# Patient Record
Sex: FEMALE | Race: WHITE | NOT HISPANIC OR LATINO | Employment: OTHER | URBAN - NONMETROPOLITAN AREA
[De-identification: names, ages, dates, MRNs, and addresses within clinical notes are randomized per-mention and may not be internally consistent; named-entity substitution may affect disease eponyms.]

---

## 2017-01-05 DIAGNOSIS — R51.9 HA (HEADACHE): Primary | ICD-10-CM

## 2017-01-05 LAB — COHGB MFR BLD: 1.6 % (ref 0–2)

## 2017-01-05 PROCEDURE — 82375 ASSAY CARBOXYHB QUANT: CPT | Performed by: FAMILY MEDICINE

## 2017-01-05 PROCEDURE — 36415 COLL VENOUS BLD VENIPUNCTURE: CPT | Performed by: FAMILY MEDICINE

## 2017-02-06 DIAGNOSIS — Z12.31 VISIT FOR SCREENING MAMMOGRAM: Primary | ICD-10-CM

## 2017-02-08 DIAGNOSIS — Z12.31 VISIT FOR SCREENING MAMMOGRAM: Primary | ICD-10-CM

## 2017-02-08 PROCEDURE — G0202 SCR MAMMO BI INCL CAD: HCPCS | Mod: TC

## 2017-02-08 PROCEDURE — 77063 BREAST TOMOSYNTHESIS BI: CPT | Mod: TC

## 2017-02-09 NOTE — Clinical Note
February 9, 2017      Darby JEFF Hawkins  [address] [place] [address]      [facility]  Breast Center      Your mammogram shows that your breast tissue is dense.  Dense breast tissue is relatively common and is found in more than 40% of women.  However, dense breast tissue may make it more difficult to identify precancerous lesions or cancer through a mammogram and may also be associated with an increased risk of breast cancer.    The results of your mammogram are given to you to raise your awareness and help you talk with your primary care physician who has received a report of your mammogram results.  Together you can decide which options are right for you based on your mammogram results, individual risk factors or physical examination.    Your images will become part of your medical file at [facility] and will be available to you for continuing care.  You are responsible for informing any new health care provider or breast imaging facility of the date and location of this exam.    Mammography is the only way to determine breast density and remains the gold standard for early breast cancer detection.  Women should continue to have annual mammograms regardless of their breast density.  If you notice any new changes in your breast(s) please inform your health care provider without delay.   If you have any questions regarding breast density, please feel free to contact [facility] Center Navigator at [phone].    Thank you for choosing [facility] for your healthcare needs.

## 2018-01-23 ENCOUNTER — AMBULATORY - GICH (OUTPATIENT)
Dept: RADIOLOGY | Facility: OTHER | Age: 78
End: 2018-01-23

## 2018-01-23 DIAGNOSIS — M25.551 PAIN IN RIGHT HIP: ICD-10-CM

## 2018-01-24 ENCOUNTER — HOSPITAL ENCOUNTER (OUTPATIENT)
Dept: RADIOLOGY | Facility: OTHER | Age: 78
End: 2018-01-24

## 2018-01-24 DIAGNOSIS — M25.551 PAIN IN RIGHT HIP: ICD-10-CM

## 2018-04-26 ENCOUNTER — RADIANT APPOINTMENT (OUTPATIENT)
Dept: MAMMOGRAPHY | Facility: OTHER | Age: 78
End: 2018-04-26
Attending: FAMILY MEDICINE
Payer: MEDICARE

## 2018-04-26 DIAGNOSIS — Z12.31 VISIT FOR SCREENING MAMMOGRAM: ICD-10-CM

## 2018-04-26 PROCEDURE — 77067 SCR MAMMO BI INCL CAD: CPT | Mod: TC

## 2018-05-01 ENCOUNTER — OFFICE VISIT (OUTPATIENT)
Dept: FAMILY MEDICINE | Facility: OTHER | Age: 78
End: 2018-05-01
Attending: NURSE PRACTITIONER
Payer: MEDICARE

## 2018-05-01 VITALS
TEMPERATURE: 98.2 F | BODY MASS INDEX: 22.58 KG/M2 | SYSTOLIC BLOOD PRESSURE: 110 MMHG | RESPIRATION RATE: 20 BRPM | HEART RATE: 74 BPM | DIASTOLIC BLOOD PRESSURE: 60 MMHG | HEIGHT: 67 IN | WEIGHT: 143.9 LBS

## 2018-05-01 DIAGNOSIS — T16.1XXA FOREIGN BODY OF RIGHT EAR, INITIAL ENCOUNTER: Primary | ICD-10-CM

## 2018-05-01 PROCEDURE — 69200 CLEAR OUTER EAR CANAL: CPT | Performed by: NURSE PRACTITIONER

## 2018-05-01 PROCEDURE — 99207 ZZC DROP WITH A PROCEDURE: CPT | Mod: 25 | Performed by: NURSE PRACTITIONER

## 2018-05-01 PROCEDURE — G0463 HOSPITAL OUTPT CLINIC VISIT: HCPCS

## 2018-05-01 ASSESSMENT — PAIN SCALES - GENERAL: PAINLEVEL: MILD PAIN (2)

## 2018-05-01 NOTE — PROGRESS NOTES
"Nursing Notes:   Sobia Haas LPN  5/1/2018 10:47 AM  Signed  Patient presents to the clinic for something stuck in her right ear. Has bought an OTC ear wax removal kit and was using it this morning and a piece broke off inside her ear. As far as patient is aware of, it is still inside her ear.   Sobia Haas LPN............. May 1, 2018 10:38 AM     SUBJECTIVE:   Darby Hawkins is a 77 year old female who presents to clinic today for the following health issues:    Ear concern      Duration: today    Description (location/character/radiation): Broke off a ear cleaning tool in her ear canal.     Intensity:  mild    Accompanying signs and symptoms: None, no s/s of systemic illness.     History (similar episodes/previous evaluation): None    Precipitating or alleviating factors: None    Therapies tried and outcome: None       Problem list and histories reviewed & adjusted, as indicated.  Additional history: as documented    Current Outpatient Prescriptions   Medication Sig Dispense Refill     ASPIRIN PO Take 81 mg by mouth daily as needed        Calcium Carbonate-Vitamin D (CALCIUM + D PO) 500-1000 MG one tablet every other day.       Cyanocobalamin (VITAMIN B 12 PO) Take 500 mcg by mouth daily        Escitalopram Oxalate (LEXAPRO PO) Take 10 mg by mouth daily.       Eszopiclone (LUNESTA PO) Take 2 mg by mouth nightly as needed for sleep       Ibuprofen (ADVIL PO) Take 400 mg by mouth nightly as needed        Allergies   Allergen Reactions     Seasonal Allergies        Reviewed and updated as needed this visit by clinical staff  Tobacco  Allergies  Meds       Reviewed and updated as needed this visit by Provider         ROS:  A comprehensive 10 point ROS was obtained and documented for notable findings in the HPI.       OBJECTIVE:     /60 (BP Location: Right arm, Patient Position: Sitting, Cuff Size: Adult Regular)  Pulse 74  Temp 98.2  F (36.8  C) (Tympanic)  Resp 20  Ht 5' 7\" (1.702 " m)  Wt 143 lb 14.4 oz (65.3 kg)  Breastfeeding? No  BMI 22.54 kg/m2  Body mass index is 22.54 kg/(m^2).  GENERAL: healthy, alert and no distress  EYES: Eyes grossly normal to inspection  HENT: normal cephalic/atraumatic, right ear: FB in ear, TM wnl and oral mucous membranes moist  RESP: with ease    Diagnostic Test Results:  none     FB well visualized and with an alligator forceps the object was removed.   The TM and canal are WNL after removal. Patient tolerated well.      ASSESSMENT/PLAN:     1. Foreign body of right ear, initial encounter  - REMOVE FB, EXT AUDITORY CANAL    Medical Decision Making:    Differential Diagnosis:  None    Serious Comorbid Conditions:  Adult:  None    PLAN:    Recommend not using tools like this again.     Followup:    If not improving or if condition worsens, follow up with your Primary Care Provider        Halle Em NP, 5/1/2018 10:48 AM

## 2018-05-01 NOTE — MR AVS SNAPSHOT
"              After Visit Summary   5/1/2018    Darby Hawkins    MRN: [id number]           Patient Information     Date Of Birth          1940        Visit Information        Provider Department      5/1/2018 10:15 AM Halle Em NP [facility]        Today's Diagnoses     Foreign body of right ear, initial encounter    -  1       Follow-ups after your visit        Follow-up notes from your care team     Return if symptoms worsen or fail to improve.      Who to contact     If you have questions or need follow up information about today's clinic visit or your schedule please contact [facility] directly at [phone].  Normal or non-critical lab and imaging results will be communicated to you by ZanAquahart, letter or phone within 4 business days after the clinic has received the results. If you do not hear from us within 7 days, please contact the clinic through ZanAquahart or phone. If you have a critical or abnormal lab result, we will notify you by phone as soon as possible.  Submit refill requests through WeStore or call your pharmacy and they will forward the refill request to us. Please allow 3 business days for your refill to be completed.          Additional Information About Your Visit        MyChart Information     WeStore gives you secure access to your electronic health record. If you see a primary care provider, you can also send messages to your care team and make appointments. If you have questions, please call your primary care clinic.  If you do not have a primary care provider, please call [phone] and they will assist you.        Care EveryWhere ID     This is your Care EveryWhere ID. This could be used by other organizations to access your [place] medical records  [id number]        Your Vitals Were     Pulse Temperature Respirations Height Breastfeeding? BMI (Body Mass Index)    74 98.2  F (36.8  C) (Tympanic) 20 5' 7\" (1.702 m) No 22.54 " kg/m2       Blood Pressure from Last 3 Encounters:   05/01/18 110/60   05/22/15 129/99   07/28/13 112/53    Weight from Last 3 Encounters:   05/01/18 143 lb 14.4 oz (65.3 kg)   05/22/15 134 lb (60.8 kg)   07/27/13 139 lb 12.4 oz (63.4 kg)              We Performed the Following     REMOVE FB, EXT AUDITORY CANAL        Primary Care Provider Fax #    Physician No Ref-Primary [phone]       No address on file        Equal Access to Services     [facility] Development[facility] CenterJEFF : Hadii aad ku hadasho Soomaali, waaxda luqadaha, qaybta kaalmada adeegyada, edgard hernandez . So [facility] [phone].    ATENCIÓN: Si habla español, tiene a lim disposición servicios gratuitos de asistencia lingüística. Llame al [phone].    We comply with applicable federal civil rights laws and [place] laws. We do not discriminate on the basis of race, color, national origin, age, disability, sex, sexual orientation, or gender identity.            Thank you!     Thank you for choosing [facility] AND [facility]  for your care. Our goal is always to provide you with excellent care. Hearing back from our patients is one way we can continue to improve our services. Please take a few minutes to complete the written survey that you may receive in the mail after your visit with us. Thank you!             Your Updated Medication List - Protect others around you: Learn how to safely use, store and throw away your medicines at www.disposemymeds.org.          This list is accurate as of 5/1/18 11:07 AM.  Always use your most recent med list.                   Brand Name Dispense Instructions for use Diagnosis    ADVIL PO      Take 400 mg by mouth nightly as needed        ASPIRIN PO      Take 81 mg by mouth daily as needed        CALCIUM + D PO      500-1000 MG one tablet every other day.        LEXAPRO PO      Take 10 mg by mouth daily.        LUNESTA PO      Take 2 mg by mouth nightly as needed for sleep        VITAMIN B 12 PO       Take 500 mcg by mouth daily

## 2018-05-01 NOTE — NURSING NOTE
Patient presents to the clinic for something stuck in her right ear. Has bought an OTC ear wax removal kit and was using it this morning and a piece broke off inside her ear. As far as patient is aware of, it is still inside her ear.   Sobia Haas LPN............. May 1, 2018 10:38 AM

## 2018-05-04 ENCOUNTER — OFFICE VISIT (OUTPATIENT)
Dept: FAMILY MEDICINE | Facility: OTHER | Age: 78
End: 2018-05-04
Attending: FAMILY MEDICINE
Payer: MEDICARE

## 2018-05-04 VITALS
HEIGHT: 67 IN | SYSTOLIC BLOOD PRESSURE: 120 MMHG | BODY MASS INDEX: 22.29 KG/M2 | HEART RATE: 68 BPM | TEMPERATURE: 97.8 F | DIASTOLIC BLOOD PRESSURE: 70 MMHG | WEIGHT: 142 LBS

## 2018-05-04 DIAGNOSIS — H60.391 INFECTIVE OTITIS EXTERNA, RIGHT: Primary | ICD-10-CM

## 2018-05-04 DIAGNOSIS — H60.391 INFECTIVE OTITIS EXTERNA, RIGHT: ICD-10-CM

## 2018-05-04 PROCEDURE — 99213 OFFICE O/P EST LOW 20 MIN: CPT | Performed by: FAMILY MEDICINE

## 2018-05-04 PROCEDURE — G0463 HOSPITAL OUTPT CLINIC VISIT: HCPCS

## 2018-05-04 RX ORDER — NEOMYCIN SULFATE, POLYMYXIN B SULFATE, HYDROCORTISONE 3.5; 10000; 1 MG/ML; [USP'U]/ML; MG/ML
3 SOLUTION/ DROPS AURICULAR (OTIC) 3 TIMES DAILY
Qty: 10 ML | Refills: 0 | Status: SHIPPED | OUTPATIENT
Start: 2018-05-04 | End: 2018-05-04

## 2018-05-04 RX ORDER — NEOMYCIN SULFATE, POLYMYXIN B SULFATE, HYDROCORTISONE 3.5; 10000; 1 MG/ML; [USP'U]/ML; MG/ML
3 SOLUTION/ DROPS AURICULAR (OTIC) 3 TIMES DAILY
Qty: 10 ML | Refills: 0 | Status: SHIPPED | OUTPATIENT
Start: 2018-05-04 | End: 2020-01-17

## 2018-05-04 ASSESSMENT — PAIN SCALES - GENERAL: PAINLEVEL: NO PAIN (0)

## 2018-05-04 NOTE — TELEPHONE ENCOUNTER
neomycin-polymyxin-hydrocortisone (CORTISPORIN) otic solution- is not carried at [facility] would like it sent somewhere else, doesn't care where.  [facility] did tell her they carry a similar ear solution but they can't change it.    [facility] called they have a lazaro-poly B- hydro suspension  [facility] called-they currently are out od stock.   [facility] called- have a bottle on hand.   Sheeba Mcdonough LPN ..........5/4/2018 2:30 PM

## 2018-05-04 NOTE — TELEPHONE ENCOUNTER
Call patient - ask if ok to send prescription to [facility]   Vidya Zuleta MD

## 2018-05-04 NOTE — PROGRESS NOTES
Nursing Notes:   Kiki Castro LPN  5/4/2018 11:07 AM  Signed  Patient presents to the clinic today for some right ear discomfort, and she states it also feels plugged.    Kiki Matthew VELAZQUEZ.................. 5/4/2018 10:58 AM       SUBJECTIVE:   CC:  Darby Hawkins is a 77 year old female who presents to clinic today for the following health issues:  Right ear symptoms.    HPI  Darby Hawkins is a 77 year old female in for evaluation of right ear plugging.  She has had problems with recurrent cerumenosis.  Had been using an over the counter ear wax mechanical removal device.  Came to [facility]  on Tuesday as part of this device broke off in her ear - she's worried that her ear was scratched or some is still in her ear.  No bleeding.      No bleeding, no discharge.  Allergies   Allergen Reactions     Seasonal Allergies      Current Outpatient Prescriptions   Medication     ASPIRIN PO     Calcium Carbonate-Vitamin D (CALCIUM + D PO)     Cyanocobalamin (VITAMIN B 12 PO)     Eszopiclone (LUNESTA PO)     Ibuprofen (ADVIL PO)     neomycin-polymyxin-hydrocortisone (CORTISPORIN) otic solution     No current facility-administered medications for this visit.       Patient Active Problem List    Diagnosis Date Noted     Syncope and collapse 07/28/2013     Priority: Medium     Past Surgical History:   Procedure Laterality Date     BACK SURGERY       BREAST SURGERY       COLONOSCOPY       COLONOSCOPY N/A 5/22/2015    Procedure: COLONOSCOPY;  Surgeon: Wellington Kellogg MD;  Location: [facility]     ENT SURGERY       EYE SURGERY       GYN SURGERY       VASCULAR SURGERY       No family history on file.    Review of Systems     PHQ-2 Score:     PHQ-2 ( 1999 Pf[employer]) 5/4/2018 5/1/2018   Q1: Little interest or pleasure in doing things 0 0   Q2: Feeling down, depressed or hopeless 0 0   PHQ-2 Score 0 0         No flowsheet data found.      OBJECTIVE:     /70 (BP Location: Right arm, Patient Position: Sitting, Cuff Size:  "Adult Regular)  Pulse 68  Temp 97.8  F (36.6  C) (Tympanic)  Ht 5' 7\" (1.702 m)  Wt 142 lb (64.4 kg)  Breastfeeding? No  BMI 22.24 kg/m2  Body mass index is 22.24 kg/(m^2).  Physical Exam   HENT:   Left Ear: External ear normal.   Left TM and canal normal.  Right TM normal, canal with abrasion posteriorly.            ASSESSMENT/PLAN:       ICD-10-CM    1. Infective otitis externa, right H60.391 DISCONTINUED: neomycin-polymyxin-hydrocortisone (CORTISPORIN) otic solution            PLAN:  1.  Discussed abrasion/scratch that is present and treating this similar to other skin abrasions.  Topical antibiotic solution for 2-3 days.  Return to clinic if bleeding, discharge, increased pain.      AILIN CELAYA MD  [facility]    "

## 2018-05-04 NOTE — MR AVS SNAPSHOT
"              After Visit Summary   5/4/2018    Darby Hawkins    MRN: [id number]           Patient Information     Date Of Birth          1940        Visit Information        Provider Department      5/4/2018 11:00 AM Vidya Middleton MD [facility]        Today's Diagnoses     Infective otitis externa, right    -  1       Follow-ups after your visit        Who to contact     If you have questions or need follow up information about today's clinic visit or your schedule please contact [facility] directly at [phone].  Normal or non-critical lab and imaging results will be communicated to you by [email or web address], letter or phone within 4 business days after the clinic has received the results. If you do not hear from us within 7 days, please contact the clinic through EntomoPharmt or phone. If you have a critical or abnormal lab result, we will notify you by phone as soon as possible.  Submit refill requests through [id number] or call your pharmacy and they will forward the refill request to us. Please allow 3 business days for your refill to be completed.          Additional Information About Your Visit        MyChart Information     [id number] gives you secure access to your electronic health record. If you see a primary care provider, you can also send messages to your care team and make appointments. If you have questions, please call your primary care clinic.  If you do not have a primary care provider, please call [phone] and they will assist you.        Care EveryWhere ID     This is your Care EveryWhere ID. This could be used by other organizations to access your [place] medical records  [id number]        Your Vitals Were     Pulse Temperature Height Breastfeeding? BMI (Body Mass Index)       68 97.8  F (36.6  C) (Tympanic) 5' 7\" (1.702 m) No 22.24 kg/m2        Blood Pressure from Last 3 Encounters:   05/04/18 120/70   05/01/18 110/60   05/22/15 129/99    " Weight from Last 3 Encounters:   05/04/18 142 lb (64.4 kg)   05/01/18 143 lb 14.4 oz (65.3 kg)   05/22/15 134 lb (60.8 kg)              Today, you had the following     No orders found for display         Today's Medication Changes          These changes are accurate as of 5/4/18 11:59 PM.  If you have any questions, ask your nurse or doctor.               Start taking these medicines.        Dose/Directions    neomycin-polymyxin-hydrocortisone otic solution   Commonly known as:  CORTISPORIN   Used for:  Infective otitis externa, right   Started by:  Vidya Middleton MD        Dose:  3 drop   Place 3 drops into the right ear 3 times daily   Quantity:  10 mL   Refills:  0         Stop taking these medicines if you haven't already. Please contact your care team if you have questions.     LEXAPRO PO   Stopped by:  Vidya Middleton MD                Where to get your medicines      These medications were sent to [facility], - [place], [place] - [address]  [email or web address], [place] [address]     Phone:  [phone]     neomycin-polymyxin-hydrocortisone otic solution                Primary Care Provider Fax #    Physician No Ref-Primary [phone]       No address on file        Equal Access to Services     BEENA MOREIRA : Norbert prajapatio Keya, waaxda luqadaha, qaybta kaalmada adeegyada, edgard chan. So [facility] [phone].    ATENCIÓN: Si habla español, tiene a lim disposición servicios gratuitos de asistencia lingüística. Llame al [phone].    We comply with applicable federal civil rights laws and [place] laws. We do not discriminate on the basis of race, color, national origin, age, disability, sex, sexual orientation, or gender identity.            Thank you!     Thank you for choosing [facility] AND [facility]  for your care. Our goal is always to provide you with excellent care. Hearing back from our  patients is one way we can continue to improve our services. Please take a few minutes to complete the written survey that you may receive in the mail after your visit with us. Thank you!             Your Updated Medication List - Protect others around you: Learn how to safely use, store and throw away your medicines at www.disposemymeds.org.          This list is accurate as of 5/4/18 11:59 PM.  Always use your most recent med list.                   Brand Name Dispense Instructions for use Diagnosis    ADVIL PO      Take 400 mg by mouth nightly as needed        ASPIRIN PO      Take 81 mg by mouth daily as needed        CALCIUM + D PO      500-1000 MG one tablet every other day.        LUNESTA PO      Take 2 mg by mouth nightly as needed for sleep        neomycin-polymyxin-hydrocortisone otic solution    CORTISPORIN    10 mL    Place 3 drops into the right ear 3 times daily    Infective otitis externa, right       VITAMIN B 12 PO      Take 500 mcg by mouth daily

## 2018-05-04 NOTE — NURSING NOTE
Patient presents to the clinic today for some right ear discomfort, and she states it also feels plugged.    Kiki Castro LPN.................. 5/4/2018 10:58 AM

## 2018-07-27 ENCOUNTER — ALLIED HEALTH/NURSE VISIT (OUTPATIENT)
Dept: FAMILY MEDICINE | Facility: OTHER | Age: 78
End: 2018-07-27
Attending: NURSE PRACTITIONER
Payer: MEDICARE

## 2018-07-27 DIAGNOSIS — Z23 NEED FOR DIPHTHERIA-TETANUS-PERTUSSIS (TDAP) VACCINE, ADULT/ADOLESCENT: Primary | ICD-10-CM

## 2018-07-27 PROCEDURE — 90471 IMMUNIZATION ADMIN: CPT

## 2018-07-27 PROCEDURE — 90715 TDAP VACCINE 7 YRS/> IM: CPT

## 2018-07-27 NOTE — MR AVS SNAPSHOT
After Visit Summary   7/27/2018    Darby Hawkins    MRN: [id number]           Patient Information     Date Of Birth          1940        Visit Information        Provider Department      7/27/2018 2:00 PM [facility] NURSE [facility]        Today's Diagnoses     Need for diphtheria-tetanus-pertussis (Tdap) vaccine, adult/adolescent    -  1       Follow-ups after your visit        Who to contact     If you have questions or need follow up information about today's clinic visit or your schedule please contact [facility] directly at [phone].  Normal or non-critical lab and imaging results will be communicated to you by NetAmerica Alliancehart, letter or phone within 4 business days after the clinic has received the results. If you do not hear from us within 7 days, please contact the clinic through 1Lay or phone. If you have a critical or abnormal lab result, we will notify you by phone as soon as possible.  Submit refill requests through 1Lay or call your pharmacy and they will forward the refill request to us. Please allow 3 business days for your refill to be completed.          Additional Information About Your Visit        MyChart Information     1Lay gives you secure access to your electronic health record. If you see a primary care provider, you can also send messages to your care team and make appointments. If you have questions, please call your primary care clinic.  If you do not have a primary care provider, please call [phone] and they will assist you.        Care EveryWhere ID     This is your Care EveryWhere ID. This could be used by other organizations to access your [place] medical records  [id number]         Blood Pressure from Last 3 Encounters:   05/04/18 120/70   05/01/18 110/60   05/22/15 129/99    Weight from Last 3 Encounters:   05/04/18 142 lb (64.4 kg)   05/01/18 143 lb 14.4 oz (65.3 kg)   05/22/15 134 lb (60.8 kg)               We Performed the Following     GH IMM-  TDAP VACCINE (BOOSTRIX )        Primary Care Provider Fax #    Physician No Ref-Primary [phone]       No address on file        Equal Access to Services     BEENA MOREIRA : Hadwagner aad ku hadayesha Lauar, petar kaplan, mo kaeyad sanders, edgard tuckeralireza chan. So [facility] [phone].    ATENCIÓN: Si habla español, tiene a lim disposición servicios gratuitos de asistencia lingüística. Llame al [phone].    We comply with applicable federal civil rights laws and [place] laws. We do not discriminate on the basis of race, color, national origin, age, disability, sex, sexual orientation, or gender identity.            Thank you!     Thank you for choosing [facility] AND [facility]  for your care. Our goal is always to provide you with excellent care. Hearing back from our patients is one way we can continue to improve our services. Please take a few minutes to complete the written survey that you may receive in the mail after your visit with us. Thank you!             Your Updated Medication List - Protect others around you: Learn how to safely use, store and throw away your medicines at www.disposemymeds.org.          This list is accurate as of 7/27/18  2:29 PM.  Always use your most recent med list.                   Brand Name Dispense Instructions for use Diagnosis    ADVIL PO      Take 400 mg by mouth nightly as needed        ASPIRIN PO      Take 81 mg by mouth daily as needed        CALCIUM + D PO      500-1000 MG one tablet every other day.        LUNESTA PO      Take 2 mg by mouth nightly as needed for sleep        neomycin-polymyxin-hydrocortisone otic solution    CORTISPORIN    10 mL    Place 3 drops into the right ear 3 times daily    Infective otitis externa, right       VITAMIN B 12 PO      Take 500 mcg by mouth daily

## 2018-08-07 ENCOUNTER — OFFICE VISIT (OUTPATIENT)
Dept: OTOLARYNGOLOGY | Facility: OTHER | Age: 78
End: 2018-08-07
Attending: OTOLARYNGOLOGY
Payer: MEDICARE

## 2018-08-07 DIAGNOSIS — J31.0 CHRONIC RHINITIS, UNSPECIFIED TYPE: Primary | ICD-10-CM

## 2018-08-07 PROCEDURE — G0463 HOSPITAL OUTPT CLINIC VISIT: HCPCS

## 2018-08-07 NOTE — MR AVS SNAPSHOT
After Visit Summary   8/7/2018    Darby Hawkins    MRN: [id number]           Patient Information     Date Of Birth          1940        Visit Information        Provider Department      8/7/2018 10:50 AM Goyo Alarcon MD [facility]        Today's Diagnoses     Chronic rhinitis, unspecified type    -  1       Follow-ups after your visit        Who to contact     If you have questions or need follow up information about today's clinic visit or your schedule please contact [facility] directly at [phone].  Normal or non-critical lab and imaging results will be communicated to you by [employer], letter or phone within 4 business days after the clinic has received the results. If you do not hear from us within 7 days, please contact the clinic through [employer] or phone. If you have a critical or abnormal lab result, we will notify you by phone as soon as possible.  Submit refill requests through [employer] or call your pharmacy and they will forward the refill request to us. Please allow 3 business days for your refill to be completed.          Additional Information About Your Visit        MyChart Information     [employer] gives you secure access to your electronic health record. If you see a primary care provider, you can also send messages to your care team and make appointments. If you have questions, please call your primary care clinic.  If you do not have a primary care provider, please call [phone] and they will assist you.        Care EveryWhere ID     This is your Care EveryWhere ID. This could be used by other organizations to access your [facility] medical records  [id number]         Blood Pressure from Last 3 Encounters:   05/04/18 120/70   05/01/18 110/60   05/22/15 129/99    Weight from Last 3 Encounters:   05/04/18 64.4 kg (142 lb)   05/01/18 65.3 kg (143 lb 14.4 oz)   05/22/15 60.8 kg (134 lb)              Today, you had the  following     No orders found for display       Primary Care Provider Fax #    Physician No Ref-Primary [phone]       No address on file        Equal Access to Services     BEENA MOREIRA : Hadii richard chavez brent Laura, petar kaplan, mo sanders, edgard Persaud [facility] [phone].    ATENCIÓN: Si habla español, tiene a lim disposición servicios gratuitos de asistencia lingüística. Llame al [phone].    We comply with applicable federal civil rights laws and [place] laws. We do not discriminate on the basis of race, color, national origin, age, disability, sex, sexual orientation, or gender identity.            Thank you!     Thank you for choosing [facility] AND [facility]  for your care. Our goal is always to provide you with excellent care. Hearing back from our patients is one way we can continue to improve our services. Please take a few minutes to complete the written survey that you may receive in the mail after your visit with us. Thank you!             Your Updated Medication List - Protect others around you: Learn how to safely use, store and throw away your medicines at www.disposemymeds.org.          This list is accurate as of 8/7/18 11:59 PM.  Always use your most recent med list.                   Brand Name Dispense Instructions for use Diagnosis    ADVIL PO      Take 400 mg by mouth nightly as needed        ASPIRIN PO      Take 81 mg by mouth daily as needed        CALCIUM + D PO      500-1000 MG one tablet every other day.        LUNESTA PO      Take 2 mg by mouth nightly as needed for sleep        neomycin-polymyxin-hydrocortisone otic solution    CORTISPORIN    10 mL    Place 3 drops into the right ear 3 times daily    Infective otitis externa, right       VITAMIN B 12 PO      Take 500 mcg by mouth daily

## 2018-08-13 NOTE — PROGRESS NOTES
REJI GOMEZ    77 Y old Female, : 1940    Account Number: [id number]    [address], [place]    Home: [phone]     Guarantor: REJI GOMEZ Insurance: [facility] MEDICARE B Payer ID: [id number]   PCP: SHAYAN JOHNSON   Appointment Facility: [facility]      2018 Goyo Alarcon MD       Current Medications Reason for Appointment     1. BALANCE ISSUES/CHRONIC SINUS CONGESTION AND HEADACHES     2. Chronic sinus symptoms     History of Present Illness     HPI:   The patient is a 77-year-old female who comes to the office today with some low-grade congestion and midfacial headaches. She is having some occasional drainage. She has some occasional ear stuffiness. She has a history of previous sinus difficulties in underwent a sinus procedure at the [facility] for an infected sphenoid sinus. She is not currently on any medications for her nose or sinuses.     Examination     General Examination:  External auditory canals are clear bilaterally   Nasal-there is no purulence or obstruction noted at this time. She has mild boggy nasal membranes. There are no polyps.   Oral cavity oropharynx-free of lesion   Neck-no masses or adenopathy   Head neck integument-Clear   Neuro-there are no focal cranial nerve deficits   General-the patient appears well and in no distress.       Assessments     1. Chronic rhinitis - J31.0 (Primary)     Treatment     1. Chronic rhinitis   Start Fluticasone Propionate Suspension, 50 MCG/ACT, 2 spray in each nostril, Nasally, Once a day, 90 days, 3 Bottles, Refills 3       2. Others   Notes: We will place her on a trial of nasal steroid spray once daily with twice daily nasal saline irrigation. If she continues to have low-grade symptoms beyond 2-3 months she should call and we will make arrangements for a CT image of her sinuses.  Procedures  [ ].          Taking      Calcium + D(Calcium-Vitamin D) 500-1000 MG EOD       Advil(Ibuprofen) 200 MG Tablet 2 tablets Orally twice a day      Vitamin B12 500 MCG Tablet 1 tablet Orally Once a day      Vitamin D3 2000 UNIT Capsule Orally      Biotin 1000 MCG Tablet 1 tablet Orally Once a day      Probiotic - Capsule 1 capsule Orally daily      Aspirin 81(Aspirin)      Nasal Decongestant(Pseudoephedrine-Guaifenesin)      Not-Taking/PRN      Lunesta(Eszopiclone) 2 MG Tablet TAKE 1 TABLET IMMEDIATELY BEFORE BEDTIME      Lunesta(Eszopiclone) 1 MG Tablet TAKE 1 TABLET BY MOUTH DAILY AT BEDTIME.      Medication List reviewed and reconciled with the patient           Past Medical History     Depression.       Sleep d/o.       Oa.       Uterine fibroids.       Colonscopy-Kohler -1 benign polyp,  1 tub adenoma, 1 hyperplastic.       DEXA SCAN 14. OSTEOPENIA. T SCORE L SPINE 1.065,LT HIP T SCORE -0.5. RECHECK 3 YEARS.       Dizziness.       Ear pressure.       Headaches.       Hearing loss.       Tinnitus.       Surgical History     vein stripping      [id number] BENIGN POLYP      eye surgery child     breast bx-benign ,     sinus surgery      cervical fusion w/ disectomy c6-7 2003     TONSILS AGE 20s     EXPLORATORY LAPAROSCOPY FOR INFERTILITY AGE 33     COLONOSCOPY-DR YARBROUGH. 1 TUB ADENOMA, 1 HYPERPLASTIC 2015     Family History     Father: , cva     Mother: , chf     Siblings: alive, sister/breast ca, sister with CHF/pulmonary hypertension     1st cousin (MATERNAL)-ovarian cancer  1ST COUSIN (PATERNAL)- BREAST CA.     Social History Electronically signed by KAREL ARRIETA MD on 2018 at 09:17 AM CDT    Tobacco Use:   Smoking   History: former smoker  History: former smoker  Smokeless Tobacco : Does Not Chew.   Second Hand Smoking Exposure : Yes father was a smoker.   Quit date: .   Miscellaneous:   Marital status: .   Caffeine: coffee.   Occupation: retired.   Children: 1 DTR.   Drug/Alcohol:   Alcohol   Points  4  Interpretation Positive  . 1 daughter  QUIT 40 SOME YEARS AGO, ONLY SMOKED DURING COLLEGE  1 GLASS OF WINE WITH DINNER.     Allergies Sign off status: Completed    SEASONAL: Allergy     Hospitalization/Major Diagnostic Procedure     SYNCOPE 2013     See surgeries      Review of Systems     [facility]  [address]  [place] [address]  Tel: [phone]  Fax:       [ ].           Patient: REJI GOMEZ : 1940 Progress Note: Goyo Alarcon MD 2018        Note generated by [employer] EMR/PM Software ([email or web address])

## 2019-05-10 ENCOUNTER — ANCILLARY PROCEDURE (OUTPATIENT)
Dept: MAMMOGRAPHY | Facility: OTHER | Age: 79
End: 2019-05-10
Attending: FAMILY MEDICINE
Payer: MEDICARE

## 2019-05-10 DIAGNOSIS — Z12.31 VISIT FOR SCREENING MAMMOGRAM: ICD-10-CM

## 2019-05-10 PROCEDURE — 77063 BREAST TOMOSYNTHESIS BI: CPT | Mod: TC

## 2019-11-26 ENCOUNTER — HOSPITAL ENCOUNTER (EMERGENCY)
Facility: OTHER | Age: 79
Discharge: HOME | End: 2019-11-27
Attending: FAMILY MEDICINE | Admitting: FAMILY MEDICINE
Payer: MEDICARE

## 2019-11-26 DIAGNOSIS — I49.1 PREMATURE ATRIAL CONTRACTIONS: ICD-10-CM

## 2019-11-26 PROCEDURE — 93010 ELECTROCARDIOGRAM REPORT: CPT | Performed by: INTERNAL MEDICINE

## 2019-11-26 PROCEDURE — 93005 ELECTROCARDIOGRAM TRACING: CPT | Performed by: FAMILY MEDICINE

## 2019-11-26 PROCEDURE — 99283 EMERGENCY DEPT VISIT LOW MDM: CPT | Mod: Z6 | Performed by: FAMILY MEDICINE

## 2019-11-26 PROCEDURE — 99283 EMERGENCY DEPT VISIT LOW MDM: CPT | Performed by: FAMILY MEDICINE

## 2019-11-26 RX ORDER — DIAZEPAM 5 MG
TABLET ORAL
COMMUNITY
Start: 2003-10-07 | End: 2020-01-17

## 2019-11-26 ASSESSMENT — MIFFLIN-ST. JEOR: SCORE: 1143.59

## 2019-11-26 NOTE — ED AVS SNAPSHOT
[facility]  [address]  [place] [address]  Phone:  [phone]  Fax:  [phone]                                    Darby Hawkins   MRN: [id number]    Department:  [facility]   Date of Visit:  11/26/2019           After Visit Summary Signature Page    I have received my discharge instructions, and my questions have been answered. I have discussed any challenges I see with this plan with the nurse or doctor.    ..........................................................................................................................................  Patient/Patient Representative Signature      ..........................................................................................................................................  Patient Representative Print Name and Relationship to Patient    ..................................................               ................................................  Date                                   Time    ..........................................................................................................................................  Reviewed by Signature/Title    ...................................................              ..............................................  Date                                               Time          22EPIC Rev 08/18

## 2019-11-27 VITALS
OXYGEN SATURATION: 95 % | SYSTOLIC BLOOD PRESSURE: 133 MMHG | HEIGHT: 67 IN | RESPIRATION RATE: 13 BRPM | TEMPERATURE: 97 F | WEIGHT: 138 LBS | HEART RATE: 68 BPM | BODY MASS INDEX: 21.66 KG/M2 | DIASTOLIC BLOOD PRESSURE: 74 MMHG

## 2019-11-27 NOTE — ED PROVIDER NOTES
History     Chief Complaint   Patient presents with     Irregular Heart Beat     HPI  Darby Hawkins is a 79 year old female with a hx of rare paroxysmal brief atrial tachycardia for many years who noted irregular and occasional fast heart beats when going to bed tonight that lasted for about 2 hours.  These sx were more severe than previous episodes and longer lasting, but she had no feelings of presyncope/fainting/chest pains/SOB; and she has not been sick - no f/c/s.  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  She is here with her .    Allergies:  Allergies   Allergen Reactions     Seasonal Allergies        Problem List:    Patient Active Problem List    Diagnosis Date Noted     Syncope and collapse 07/28/2013     Priority: Medium        Past Medical History:    Past Medical History:   Diagnosis Date     Arthritis        Past Surgical History:    Past Surgical History:   Procedure Laterality Date     BACK SURGERY       BREAST SURGERY       COLONOSCOPY       COLONOSCOPY N/A 5/22/2015    Procedure: COLONOSCOPY;  Surgeon: Wellington Kellogg MD;  Location: [facility]     ENT SURGERY       EYE SURGERY       GYN SURGERY       VASCULAR SURGERY         Family History:    History reviewed. No pertinent family history.    Social History:  Marital Status:   [2]  Social History     Tobacco Use     Smoking status: Former Smoker     Years: 2.00     Smokeless tobacco: Never Used   Substance Use Topics     Alcohol use: Yes     Comment: social     Drug use: Never        Medications:    Calcium Carbonate-Vitamin D (CALCIUM + D PO)  Ibuprofen (ADVIL PO)  ASPIRIN PO  Cyanocobalamin (VITAMIN B 12 PO)  diazepam (VALIUM) 5 MG tablet  Eszopiclone (LUNESTA PO)  neomycin-polymyxin-hydrocortisone (CORTISPORIN) otic solution          Review of Systems   Constitutional: Positive for fatigue (poor sleep recently.). Negative for chills, diaphoresis and fever.        She denies  "any cold remedies or stimulant ingestion.   HENT: Negative.    Eyes: Negative.    Respiratory: Negative for chest tightness and shortness of breath.    Cardiovascular: Positive for palpitations. Negative for chest pain and leg swelling.   Gastrointestinal: Negative.    Genitourinary: Negative.    Musculoskeletal: Negative.    Skin: Negative.    Neurological: Negative for tremors, weakness and numbness.   Psychiatric/Behavioral: The patient is nervous/anxious.        Physical Exam   BP: (!) 164/90  Pulse: 73  Heart Rate: 71  Temp: 97  F (36.1  C)  Resp: 16  Height: 170.2 cm (5' 7\")  Weight: 62.6 kg (138 lb)  SpO2: 99 %      Physical Exam  Vitals signs and nursing note reviewed.   Constitutional:       General: She is not in acute distress.     Appearance: Normal appearance. She is normal weight. She is not ill-appearing, toxic-appearing or diaphoretic.   HENT:      Head: Normocephalic.      Right Ear: Tympanic membrane, ear canal and external ear normal.      Left Ear: Tympanic membrane, ear canal and external ear normal.      Nose: Nose normal.   Neck:      Musculoskeletal: Normal range of motion and neck supple.   Cardiovascular:      Rate and Rhythm: Normal rate.   Pulmonary:      Effort: Pulmonary effort is normal.      Breath sounds: Normal breath sounds.   Abdominal:      General: Bowel sounds are normal. There is no distension.      Palpations: Abdomen is soft.      Tenderness: There is no abdominal tenderness.   Musculoskeletal: Normal range of motion.         General: No swelling or tenderness.      Right lower leg: No edema.      Left lower leg: No edema.   Skin:     General: Skin is warm and dry.      Findings: No bruising.   Neurological:      General: No focal deficit present.      Mental Status: She is alert.      Cranial Nerves: No cranial nerve deficit.      Sensory: No sensory deficit.      Deep Tendon Reflexes: Reflexes normal.           30-DAY EVENT MONITOR  ORDERING PHYSICIAN:  Amina Cunningham, " MD  INDICATIONS:  Syncope and collapse.  ENROLLMENT PERIOD:   August 7, 2013 to September 5, 2013.  MONITOR:  [employer] 3X  FINDINGS:  Review of the transmissions shows that there are 13  available for review, a baseline and 12 that were for irregular heart  beat, lightheaded, fluttery feeling.  Review of all of these tracings  shows that the patient has underlying sinus rhythm with premature  atrial contractions noted.  There is no tachycardia noted.  On one  transmission there were 3 beats of supraventricular, actually looks  like atrial tachycardia, 3 beats only at a rate of 150.  The rest of  them were just sinus rhythm with a single premature atrial  contraction.  ASSESSMENT:  30-DAY EVENT MONITOR DONE FOR SYNCOPE AND COLLAPSE.  THERE WERE 13 TRANSMISSIONS AVAILABLE FOR REVIEW; ONE BASELINE AND THE  REST OF THEM FOR VARIOUS SYMPTOMS OF IRREGULAR HEART BEAT, PALPATIONS,  AND FLUTTERING.  ALL OF THEM DO SHOW A PREMATURE ATRIAL CONTRACTION.  ONE TRANSMISSION SHOWS 3 BEATS OF RAPID HEART RATE  BEATS PER  MINUTE, LOOKS LIKE ATRIAL TACHYCARDIA, ONLY 3 BEATS HOWEVER.          SIGNATURE PAGE ONLY    Exam Date: Sep 09, 2013 06:40:30 AM  Author: GERI DUCKWORTH  This report is final and signed  ED Course        Procedures           EKG: Normal except rare PAC.    Critical Care time:  none               No results found for this or any previous visit (from the past 24 hour(s)).    Medications - No data to display  Discussion with patient regarding her EKG and reassuring exam and lack of associated sx with basically resolution her symptoms since arrival to the ED.  We discussed options for additonal lab work up versus discharge home and clinic follow up.  Patient declines waiting for labs and would like to discharge home.    Assessments & Plan (with Medical Decision Making)   79 year old female with paroxysmal atrial dysrhythmias/tachycardia with 2 hour episode tonight that did not cause any CP or SOB or  any other associated sx and resolved spontaneously just with ED arrival.  She has some stressors currently. She has no recurrence in the ED and she is declining lab work and will f/u as an outpatient.    I have reviewed the nursing notes.    I have reviewed the findings, diagnosis, plan and need for follow up with the patient.       Discharge Medication List as of 11/26/2019 11:57 PM          Final diagnoses:   Premature atrial contractions       11/26/2019   [facility]  11/28/19 0809

## 2019-11-27 NOTE — DISCHARGE INSTRUCTIONS
Dear MsNahum Ross,  It was nice to meet you.  As we talked, your EKG is consistent with your previous cardiac monitoring showing occasional premature atrial complexes but it is otherwise unchanged from 2013.  Your report of no associated symptoms (no chest pain or feeling faint or feeling short of breath or nauseous or sweaty) is reassuring, as is your exam.  Since your symptoms have improved I think it is okay to schedule follow up in clinic for recheck.    Avoid all stimulants and return if you have worsening symptoms for recheck and some lab work.    Dr. Blayne Gonzales

## 2019-11-27 NOTE — ED TRIAGE NOTES
"Pt arrives to the ED via private car with spouse.  Pt states that for the past 2 hours she was having irregular hearbeats.  Pt states it was 2 \"quick thumps\" and then 1, this was going on for \"awhile\".  Pt states that she is not sure if it is beating like that or not anymore.  Pt reports a hx pre atrial contractions.  Pt reports no other symptoms at this time.  "

## 2019-11-28 ASSESSMENT — ENCOUNTER SYMPTOMS
EYES NEGATIVE: 1
CHILLS: 0
FATIGUE: 1
WEAKNESS: 0
MUSCULOSKELETAL NEGATIVE: 1
TREMORS: 0
PALPITATIONS: 1
GASTROINTESTINAL NEGATIVE: 1
DIAPHORESIS: 0
SHORTNESS OF BREATH: 0
NERVOUS/ANXIOUS: 1
CHEST TIGHTNESS: 0
NUMBNESS: 0
FEVER: 0

## 2019-12-05 ENCOUNTER — TRANSFERRED RECORDS (OUTPATIENT)
Dept: HEALTH INFORMATION MANAGEMENT | Facility: OTHER | Age: 79
End: 2019-12-05

## 2019-12-12 ENCOUNTER — TRANSFERRED RECORDS (OUTPATIENT)
Dept: HEALTH INFORMATION MANAGEMENT | Facility: OTHER | Age: 79
End: 2019-12-12

## 2020-01-07 ENCOUNTER — TRANSFERRED RECORDS (OUTPATIENT)
Dept: HEALTH INFORMATION MANAGEMENT | Facility: OTHER | Age: 80
End: 2020-01-07

## 2020-01-07 LAB — EJECTION FRACTION: 68 %

## 2020-01-17 ENCOUNTER — TELEPHONE (OUTPATIENT)
Dept: FAMILY MEDICINE | Facility: OTHER | Age: 80
End: 2020-01-17

## 2020-01-17 RX ORDER — ZINC OXIDE 13 %
CREAM (GRAM) TOPICAL
COMMUNITY
Start: 2020-01-17

## 2020-01-17 RX ORDER — CHOLECALCIFEROL (VITAMIN D3) 50 MCG
1 TABLET ORAL DAILY
COMMUNITY
Start: 2020-01-17

## 2020-01-17 RX ORDER — ALPRAZOLAM 0.5 MG
TABLET ORAL
COMMUNITY
Start: 2020-01-17

## 2020-01-17 RX ORDER — DILTIAZEM HCL 60 MG
60 TABLET ORAL DAILY
COMMUNITY
Start: 2020-01-17

## 2020-01-17 RX ORDER — FLUTICASONE PROPIONATE 50 MCG
2 SPRAY, SUSPENSION (ML) NASAL DAILY
COMMUNITY
Start: 2020-01-17

## 2020-01-17 RX ORDER — BIOTIN 1 MG
1000 TABLET ORAL DAILY
COMMUNITY
Start: 2020-01-17

## 2020-01-17 RX ORDER — IBUPROFEN 200 MG
400 TABLET ORAL 2 TIMES DAILY PRN
COMMUNITY
Start: 2020-01-17

## 2020-01-17 NOTE — TELEPHONE ENCOUNTER
Patient dropped of a medication list, and requested her chart to be updated.     Kiki Castro LPN.................. 1/17/2020 2:46 PM

## 2020-03-02 ENCOUNTER — HEALTH MAINTENANCE LETTER (OUTPATIENT)
Age: 80
End: 2020-03-02

## 2020-09-30 ENCOUNTER — TELEPHONE (OUTPATIENT)
Dept: CARDIOLOGY | Facility: OTHER | Age: 80
End: 2020-09-30

## 2020-10-05 ENCOUNTER — HOSPITAL ENCOUNTER (OUTPATIENT)
Dept: NUCLEAR MEDICINE | Facility: OTHER | Age: 80
End: 2020-10-05
Attending: FAMILY MEDICINE
Payer: MEDICARE

## 2020-10-05 VITALS — BODY MASS INDEX: 22.29 KG/M2 | WEIGHT: 142 LBS | TEMPERATURE: 97.2 F | OXYGEN SATURATION: 97 % | HEIGHT: 67 IN

## 2020-10-05 DIAGNOSIS — R06.02 EXERTIONAL SHORTNESS OF BREATH: ICD-10-CM

## 2020-10-05 LAB
CV BLOOD PRESSURE: 77 %
NUC STRESS EJECTION FRACTION: 75 %
STRESS ECHO TARGET HR: 142

## 2020-10-05 PROCEDURE — 78452 HT MUSCLE IMAGE SPECT MULT: CPT

## 2020-10-05 PROCEDURE — 343N000001 HC RX 343: Performed by: FAMILY MEDICINE

## 2020-10-05 PROCEDURE — 93016 CV STRESS TEST SUPVJ ONLY: CPT | Performed by: INTERNAL MEDICINE

## 2020-10-05 PROCEDURE — 93018 CV STRESS TEST I&R ONLY: CPT | Performed by: INTERNAL MEDICINE

## 2020-10-05 PROCEDURE — A9500 TC99M SESTAMIBI: HCPCS | Performed by: FAMILY MEDICINE

## 2020-10-05 PROCEDURE — 93017 CV STRESS TEST TRACING ONLY: CPT

## 2020-10-05 RX ADMIN — KIT FOR THE PREPARATION OF TECHNETIUM TC99M SESTAMIBI 8.36 MILLICURIE: 1 INJECTION, POWDER, LYOPHILIZED, FOR SOLUTION PARENTERAL at 08:25

## 2020-10-05 RX ADMIN — KIT FOR THE PREPARATION OF TECHNETIUM TC99M SESTAMIBI 31.7 MILLICURIE: 1 INJECTION, POWDER, LYOPHILIZED, FOR SOLUTION PARENTERAL at 10:05

## 2020-10-05 ASSESSMENT — MIFFLIN-ST. JEOR: SCORE: 1156.74

## 2020-10-05 NOTE — PROGRESS NOTES
0813 The patient arrived for a Cardiolite stress test. The procedure, risks, and benefits were discussed with the patient , and the consent was signed. A saline lock was started, and the Cardiolite was injected by Nuclear Medicine. The patient was taken to the waiting area, to await resting images at  0835.  0940 the patient returned from Nuclear Medicine and was prepped for the stress test.  Dr. Ayoub  arrived.  The patient walked  5 minutes and 40 seconds.  The patient tolerated the procedure well.  She was given a snack and taken to Nuclear Medicine in stable condition, for stress images. The saline lock will be removed by Nuclear Medicine for proper disposal.  The patient was instructed that the ordering MD will call with test results in one to two days.  Please see the chart for the comp lete test results.

## 2020-12-20 ENCOUNTER — HEALTH MAINTENANCE LETTER (OUTPATIENT)
Age: 80
End: 2020-12-20

## 2021-01-12 ENCOUNTER — OFFICE VISIT (OUTPATIENT)
Dept: OTOLARYNGOLOGY | Facility: OTHER | Age: 81
End: 2021-01-12
Attending: OTOLARYNGOLOGY
Payer: MEDICARE

## 2021-01-12 DIAGNOSIS — H90.3 SENSORY HEARING LOSS, BILATERAL: Primary | ICD-10-CM

## 2021-01-12 DIAGNOSIS — H61.21 IMPACTED CERUMEN OF RIGHT EAR: ICD-10-CM

## 2021-01-12 PROCEDURE — G0463 HOSPITAL OUTPT CLINIC VISIT: HCPCS

## 2021-01-12 NOTE — PROGRESS NOTES
document embedded image  Patient Name: Darby Hawkins    Address: [facility] [address] [place] [address]    YOB: 1940    [place] [address]    MR Number: [id number]    Phone: [phone]  PCP: Amina Cunningham MD            Appointment Date: 01/12/21   Visit Provider: Goyo Alarcon MD    cc: Amina Cunningham MD; ~    ENT Progress Note  Visit Reasons: Sudden hearing loss right ear    HPI  History of Present Illness  Chief complaint:  Drop in hearing right side    History  Patient is a 80-year-old female who has hearing aids that she has used for the last couple of years.  She awoke Sunday morning with a noticeable drop in her hearing on the right.  She is noted some imbalance but denies true vertigo.  He presents today for assistance in evaluation.    Exam:  The external auditory canal on the right was occluded near the drum.  The material was carefully cleared away using an alligator forceps.  The left ear canal and TM are healthy.  Tuning fork responses are normal.  Remainder of the head neck exam is unremarkable  Audiogram reveals a symmetric sensorineural hearing loss with speech reception threshold of 45 and discrimination scores at 96%.  The tympanograms are normal.    Home Medications     - Last Reconciled 01/13/21 by Yanira Soto CMA    alprazolam 0.5 mg tablet  TAKE 1 TABLET BY MOUTH AT BEDTIME AS NEEDED FOR ANXIETY  ascorbate calcium (vitamin C) 500 mg tablet  500 mg PO DAILY  biotin 1,000 mcg chewable tablet  1,000 mcg PO DAILY  calcium carbonate 600 mg (1,500 mg)-vitamin D3 500 unit capsule (Calcium 600 with Vitamin D3)  caps PO  cholecalciferol (vitamin D3) 2,000 unit capsule  50 mcg PO DAILY  diltiazem HCl 60 mg tablet  60 mg PO DAILY  fluticasone propionate 50 mcg/actuation nasal spray,suspension  2 sprays intranasal DAILY  ibuprofen 200 mg tablet (Advil)  200 mg PO BID PRN  lactobacillus combination no.8 3 billion cell capsule (Adult Probiotic)  3,000 mmu cells PO  DAILY  metoprolol succinate 25 mg tablet,extended release 24 hr  12.5 mg (1/2 x 25 mg) PO DAILY  phenylephrine HCl 10 mg tablet  10 mg PO DAILY PRN    Allergies    No Known Allergies Allergy (Verified 21 10:34)    PFSH  PFSH:     Medical History (Updated 21 @ 11:32 by Goyo Alarcon MD)    Depression, major, recurrent, 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 OSTEOPENIA. T SCORE L SPINE 1.065,LT HIP T SCORE -0.5. RECHECK 3 YEARS  History of uterine fibroid  Premature atrial contraction (18)  Primary insomnia (18)     Surgical History (Reviewed 21 @ 10:34 by Yanira Soto CMA)    History of breast biopsy  Benign  ,   History of colonoscopy  colonscopy-Wellington -1 benign polyp,  1 tub adenoma, 1 hyperplastic  History of fusion of cervical spine  With diskectomy C6-7    History of laparoscopy    Diagnostic for infertility  History of sinus surgery    History of vein stripping    Hx of tonsillectomy  20's     Family History (Reviewed 21 @ 10:34 by Yanira Soto CMA)  Father     Stroke  Mother     Congestive heart failure  Sister  Breast cancer  Sister  Pulmonary hypertension  Congestive heart failure     Social History (Reviewed 21 @ 10:35 by Yanira Soto CMA)  Smoking Status:  Former smoker  second hand exposure:  Yes (YOUTH, FATHER SMOKED)  alcohol intake:  current alcohol intake frequency: 0-2 drinks per day Alcohol type: wine  substance use type:  does not use  Hand Dominance:  Right handed  marital status:    number of children:  1  current occupational status:  retired  Do you exercise regularly?:  Yes  frequency:  1-2 times per week  what type of physical activity do you participate in?:  other cardio  caffeine:  Yes Type: coffee Number of servings:  2       A&P  Assessment & Plan  (1) Sensorineural hearing loss, bilateral:        Status: Acute        Code(s):  H90.3 - Sensorineural hearing loss, bilateral  (2) Impacted cerumen of right ear:        Status: Acute        Code(s):  H61.21 - Impacted cerumen, right ear      Goyo Alarcon MD    01/12/21 0758    <Electronically signed by Goyo Alarcon MD> 01/13/21 2737

## 2021-04-18 ENCOUNTER — HEALTH MAINTENANCE LETTER (OUTPATIENT)
Age: 81
End: 2021-04-18

## 2021-10-03 ENCOUNTER — HEALTH MAINTENANCE LETTER (OUTPATIENT)
Age: 81
End: 2021-10-03

## 2022-05-14 ENCOUNTER — HEALTH MAINTENANCE LETTER (OUTPATIENT)
Age: 82
End: 2022-05-14

## 2022-09-04 ENCOUNTER — HEALTH MAINTENANCE LETTER (OUTPATIENT)
Age: 82
End: 2022-09-04

## 2023-06-03 ENCOUNTER — HEALTH MAINTENANCE LETTER (OUTPATIENT)
Age: 83
End: 2023-06-03

## 2023-11-16 ENCOUNTER — LAB REQUISITION (OUTPATIENT)
Dept: LAB | Facility: CLINIC | Age: 83
End: 2023-11-16
Payer: MEDICARE

## 2023-11-16 DIAGNOSIS — D48.5 NEOPLASM OF UNCERTAIN BEHAVIOR OF SKIN: ICD-10-CM

## 2023-11-16 PROCEDURE — 88305 TISSUE EXAM BY PATHOLOGIST: CPT | Mod: TC,ORL | Performed by: STUDENT IN AN ORGANIZED HEALTH CARE EDUCATION/TRAINING PROGRAM

## 2023-11-16 PROCEDURE — 88305 TISSUE EXAM BY PATHOLOGIST: CPT | Mod: 26 | Performed by: DERMATOLOGY

## 2023-11-20 LAB
PATH REPORT.COMMENTS IMP SPEC: ABNORMAL
PATH REPORT.COMMENTS IMP SPEC: ABNORMAL
PATH REPORT.COMMENTS IMP SPEC: YES
PATH REPORT.FINAL DX SPEC: ABNORMAL
PATH REPORT.GROSS SPEC: ABNORMAL
PATH REPORT.MICROSCOPIC SPEC OTHER STN: ABNORMAL
PATH REPORT.RELEVANT HX SPEC: ABNORMAL

## 2023-12-08 ENCOUNTER — LAB REQUISITION (OUTPATIENT)
Dept: LAB | Facility: CLINIC | Age: 83
End: 2023-12-08
Payer: MEDICARE

## 2023-12-08 DIAGNOSIS — D04.61 CARCINOMA IN SITU OF SKIN OF RIGHT UPPER LIMB, INCLUDING SHOULDER: ICD-10-CM

## 2023-12-08 PROCEDURE — 88305 TISSUE EXAM BY PATHOLOGIST: CPT | Mod: 26 | Performed by: DERMATOLOGY

## 2023-12-08 PROCEDURE — 88305 TISSUE EXAM BY PATHOLOGIST: CPT | Mod: TC,ORL | Performed by: STUDENT IN AN ORGANIZED HEALTH CARE EDUCATION/TRAINING PROGRAM

## 2023-12-13 LAB
PATH REPORT.COMMENTS IMP SPEC: NORMAL
PATH REPORT.COMMENTS IMP SPEC: NORMAL
PATH REPORT.FINAL DX SPEC: NORMAL
PATH REPORT.GROSS SPEC: NORMAL
PATH REPORT.MICROSCOPIC SPEC OTHER STN: NORMAL
PATH REPORT.RELEVANT HX SPEC: NORMAL

## 2024-02-12 ENCOUNTER — APPOINTMENT (OUTPATIENT)
Dept: MRI IMAGING | Facility: OTHER | Age: 84
End: 2024-02-12
Attending: EMERGENCY MEDICINE
Payer: MEDICARE

## 2024-02-12 ENCOUNTER — APPOINTMENT (OUTPATIENT)
Dept: CT IMAGING | Facility: OTHER | Age: 84
End: 2024-02-12
Attending: EMERGENCY MEDICINE
Payer: MEDICARE

## 2024-02-12 ENCOUNTER — HOSPITAL ENCOUNTER (EMERGENCY)
Facility: OTHER | Age: 84
Discharge: HOME | End: 2024-02-12
Attending: EMERGENCY MEDICINE | Admitting: EMERGENCY MEDICINE
Payer: MEDICARE

## 2024-02-12 VITALS
WEIGHT: 142 LBS | HEART RATE: 66 BPM | DIASTOLIC BLOOD PRESSURE: 80 MMHG | OXYGEN SATURATION: 98 % | BODY MASS INDEX: 22.24 KG/M2 | RESPIRATION RATE: 20 BRPM | SYSTOLIC BLOOD PRESSURE: 145 MMHG | TEMPERATURE: 97.7 F

## 2024-02-12 DIAGNOSIS — G45.9 TIA (TRANSIENT ISCHEMIC ATTACK): ICD-10-CM

## 2024-02-12 DIAGNOSIS — R41.3 MEMORY LOSS: ICD-10-CM

## 2024-02-12 LAB
ALBUMIN SERPL BCG-MCNC: 4.4 G/DL (ref 3.5–5.2)
ALBUMIN UR-MCNC: NEGATIVE MG/DL
ALP SERPL-CCNC: 96 U/L (ref 40–150)
ALT SERPL W P-5'-P-CCNC: 27 U/L (ref 0–50)
ANION GAP SERPL CALCULATED.3IONS-SCNC: 11 MMOL/L (ref 7–15)
APPEARANCE UR: CLEAR
AST SERPL W P-5'-P-CCNC: 36 U/L (ref 0–45)
BASOPHILS # BLD AUTO: 0 10E3/UL (ref 0–0.2)
BASOPHILS NFR BLD AUTO: 0 %
BILIRUB SERPL-MCNC: 0.2 MG/DL
BILIRUB UR QL STRIP: NEGATIVE
BUN SERPL-MCNC: 18.6 MG/DL (ref 8–23)
CALCIUM SERPL-MCNC: 9.1 MG/DL (ref 8.8–10.2)
CHLORIDE SERPL-SCNC: 105 MMOL/L (ref 98–107)
COLOR UR AUTO: YELLOW
CREAT SERPL-MCNC: 0.87 MG/DL (ref 0.51–0.95)
DEPRECATED HCO3 PLAS-SCNC: 25 MMOL/L (ref 22–29)
EGFRCR SERPLBLD CKD-EPI 2021: 67 ML/MIN/1.73M2
EOSINOPHIL # BLD AUTO: 0.1 10E3/UL (ref 0–0.7)
EOSINOPHIL NFR BLD AUTO: 2 %
ERYTHROCYTE [DISTWIDTH] IN BLOOD BY AUTOMATED COUNT: 12.8 % (ref 10–15)
GLUCOSE BLDC GLUCOMTR-MCNC: 139 MG/DL (ref 70–99)
GLUCOSE SERPL-MCNC: 162 MG/DL (ref 70–99)
GLUCOSE UR STRIP-MCNC: NEGATIVE MG/DL
HCT VFR BLD AUTO: 42.1 % (ref 35–47)
HGB BLD-MCNC: 13.8 G/DL (ref 11.7–15.7)
HGB UR QL STRIP: NEGATIVE
HOLD SPECIMEN: NORMAL
IMM GRANULOCYTES # BLD: 0 10E3/UL
IMM GRANULOCYTES NFR BLD: 0 %
INR PPP: 0.97 (ref 0.85–1.15)
KETONES UR STRIP-MCNC: NEGATIVE MG/DL
LACTATE SERPL-SCNC: 0.9 MMOL/L (ref 0.7–2)
LACTATE SERPL-SCNC: 2.1 MMOL/L (ref 0.7–2)
LEUKOCYTE ESTERASE UR QL STRIP: NEGATIVE
LYMPHOCYTES # BLD AUTO: 1.7 10E3/UL (ref 0.8–5.3)
LYMPHOCYTES NFR BLD AUTO: 30 %
MCH RBC QN AUTO: 30.4 PG (ref 26.5–33)
MCHC RBC AUTO-ENTMCNC: 32.8 G/DL (ref 31.5–36.5)
MCV RBC AUTO: 93 FL (ref 78–100)
MONOCYTES # BLD AUTO: 0.3 10E3/UL (ref 0–1.3)
MONOCYTES NFR BLD AUTO: 5 %
MUCOUS THREADS #/AREA URNS LPF: PRESENT /LPF
NEUTROPHILS # BLD AUTO: 3.5 10E3/UL (ref 1.6–8.3)
NEUTROPHILS NFR BLD AUTO: 63 %
NITRATE UR QL: NEGATIVE
NRBC # BLD AUTO: 0 10E3/UL
NRBC BLD AUTO-RTO: 0 /100
PH UR STRIP: 6.5 [PH] (ref 5–9)
PLATELET # BLD AUTO: 174 10E3/UL (ref 150–450)
POTASSIUM SERPL-SCNC: 3.9 MMOL/L (ref 3.4–5.3)
PROT SERPL-MCNC: 6.8 G/DL (ref 6.4–8.3)
RBC # BLD AUTO: 4.54 10E6/UL (ref 3.8–5.2)
RBC URINE: <1 /HPF
SODIUM SERPL-SCNC: 141 MMOL/L (ref 135–145)
SP GR UR STRIP: 1.01 (ref 1–1.03)
UROBILINOGEN UR STRIP-MCNC: NORMAL MG/DL
WBC # BLD AUTO: 5.5 10E3/UL (ref 4–11)
WBC URINE: 0 /HPF

## 2024-02-12 PROCEDURE — 255N000002 HC RX 255 OP 636: Mod: JZ | Performed by: EMERGENCY MEDICINE

## 2024-02-12 PROCEDURE — 85610 PROTHROMBIN TIME: CPT | Performed by: EMERGENCY MEDICINE

## 2024-02-12 PROCEDURE — 93010 ELECTROCARDIOGRAM REPORT: CPT | Performed by: INTERNAL MEDICINE

## 2024-02-12 PROCEDURE — 250N000011 HC RX IP 250 OP 636: Performed by: EMERGENCY MEDICINE

## 2024-02-12 PROCEDURE — 99285 EMERGENCY DEPT VISIT HI MDM: CPT | Mod: 25 | Performed by: EMERGENCY MEDICINE

## 2024-02-12 PROCEDURE — 70450 CT HEAD/BRAIN W/O DYE: CPT | Mod: MA

## 2024-02-12 PROCEDURE — 82962 GLUCOSE BLOOD TEST: CPT

## 2024-02-12 PROCEDURE — A9575 INJ GADOTERATE MEGLUMI 0.1ML: HCPCS | Mod: JZ | Performed by: EMERGENCY MEDICINE

## 2024-02-12 PROCEDURE — 80053 COMPREHEN METABOLIC PANEL: CPT | Performed by: EMERGENCY MEDICINE

## 2024-02-12 PROCEDURE — 250N000013 HC RX MED GY IP 250 OP 250 PS 637: Performed by: EMERGENCY MEDICINE

## 2024-02-12 PROCEDURE — 85004 AUTOMATED DIFF WBC COUNT: CPT | Performed by: EMERGENCY MEDICINE

## 2024-02-12 PROCEDURE — 83605 ASSAY OF LACTIC ACID: CPT | Performed by: EMERGENCY MEDICINE

## 2024-02-12 PROCEDURE — 81001 URINALYSIS AUTO W/SCOPE: CPT | Performed by: EMERGENCY MEDICINE

## 2024-02-12 PROCEDURE — 258N000003 HC RX IP 258 OP 636: Performed by: EMERGENCY MEDICINE

## 2024-02-12 PROCEDURE — 70496 CT ANGIOGRAPHY HEAD: CPT | Mod: MA

## 2024-02-12 PROCEDURE — 93005 ELECTROCARDIOGRAM TRACING: CPT | Performed by: EMERGENCY MEDICINE

## 2024-02-12 PROCEDURE — 99283 EMERGENCY DEPT VISIT LOW MDM: CPT | Performed by: EMERGENCY MEDICINE

## 2024-02-12 PROCEDURE — 70553 MRI BRAIN STEM W/O & W/DYE: CPT | Mod: MG

## 2024-02-12 PROCEDURE — 36415 COLL VENOUS BLD VENIPUNCTURE: CPT | Performed by: EMERGENCY MEDICINE

## 2024-02-12 RX ORDER — ASPIRIN 81 MG/1
81 TABLET ORAL DAILY
Qty: 14 TABLET | Refills: 0 | Status: SHIPPED | OUTPATIENT
Start: 2024-02-12 | End: 2024-02-26

## 2024-02-12 RX ORDER — ACETAMINOPHEN 325 MG/1
650 TABLET ORAL ONCE
Status: COMPLETED | OUTPATIENT
Start: 2024-02-12 | End: 2024-02-12

## 2024-02-12 RX ORDER — GADOTERATE MEGLUMINE 376.9 MG/ML
15 INJECTION INTRAVENOUS ONCE
Status: COMPLETED | OUTPATIENT
Start: 2024-02-12 | End: 2024-02-12

## 2024-02-12 RX ORDER — IOPAMIDOL 755 MG/ML
75 INJECTION, SOLUTION INTRAVASCULAR ONCE
Status: COMPLETED | OUTPATIENT
Start: 2024-02-12 | End: 2024-02-12

## 2024-02-12 RX ADMIN — ACETAMINOPHEN 650 MG: 325 TABLET, FILM COATED ORAL at 18:03

## 2024-02-12 RX ADMIN — GADOTERATE MEGLUMINE 13 ML: 376.9 INJECTION INTRAVENOUS at 15:06

## 2024-02-12 RX ADMIN — IOPAMIDOL 75 ML: 755 INJECTION, SOLUTION INTRAVENOUS at 12:40

## 2024-02-12 RX ADMIN — SODIUM CHLORIDE 500 ML: 9 INJECTION, SOLUTION INTRAVENOUS at 12:50

## 2024-02-12 ASSESSMENT — ENCOUNTER SYMPTOMS
NECK PAIN: 0
RESPIRATORY NEGATIVE: 1
CHILLS: 0
MUSCULOSKELETAL NEGATIVE: 1
PSYCHIATRIC NEGATIVE: 1
ALLERGIC/IMMUNOLOGIC NEGATIVE: 1
GASTROINTESTINAL NEGATIVE: 1
FEVER: 0
CARDIOVASCULAR NEGATIVE: 1
ENDOCRINE NEGATIVE: 1
PHOTOPHOBIA: 0
VOMITING: 0
HEADACHES: 1
CONSTITUTIONAL NEGATIVE: 1
HEMATOLOGIC/LYMPHATIC NEGATIVE: 1
NECK STIFFNESS: 0
EYES NEGATIVE: 1
NAUSEA: 0
MYALGIAS: 0

## 2024-02-12 NOTE — DISCHARGE INSTRUCTIONS
1) You have a nodule noticed in your lungs. You must recheck with your doctor to have it rechecked.   2) Follow the aftercare instructions provided  3) Follow up with your doctor this week  4) Your blood pressure today was elevated. You must have it rechecked by your primary care doctor within one week.

## 2024-02-12 NOTE — ED PROVIDER NOTES
History     Chief Complaint   Patient presents with    Memory Loss     HPI  Darby Hawkins is a 83 year old female who presents today with complaints of memory loss.  Patient states that she was about an hour ago at her computer when she seemed to not remember her name and password.  She also felt that she could remember the date to where she was.  She also had some pain over her left thigh.  Patient called out to her  who came to her.   says that she was back at baseline.  Patient insisting that she is not quite at baseline.  Denies any additional complaints.  Has a history of remote trauma approximately 10 days ago when she hit her head on a board that she was standing up.  No loss of consciousness at that time.  No additional complaints.    Allergies:  Allergies   Allergen Reactions    Seasonal Allergies        Problem List:    Patient Active Problem List    Diagnosis Date Noted    Syncope and collapse 07/28/2013     Priority: Medium        Past Medical History:    Past Medical History:   Diagnosis Date    Arthritis        Past Surgical History:    Past Surgical History:   Procedure Laterality Date    BACK SURGERY      BREAST SURGERY      COLONOSCOPY      COLONOSCOPY N/A 5/22/2015    Procedure: COLONOSCOPY;  Surgeon: Wellington Kellogg MD;  Location: [facility]    ENT SURGERY      EYE SURGERY      GYN SURGERY      VASCULAR SURGERY         Family History:    History reviewed. No pertinent family history.    Social History:  Marital Status:   [2]  Social History     Tobacco Use    Smoking status: Former     Years: 2     Types: Cigarettes    Smokeless tobacco: Never   Substance Use Topics    Alcohol use: Yes     Comment: social    Drug use: Never        Medications:    ALPRAZolam (XANAX) 0.5 MG tablet  biotin 1000 MCG TABS tablet  calcium carbonate-vitamin D (OS-VIC) 600-400 MG-UNIT chewable tablet  diltiazem (CARDIZEM) 60 MG tablet  fluticasone (FLONASE) 50 MCG/ACT nasal spray  ibuprofen (ADVIL)  200 MG tablet  Probiotic Product (PROBIOTIC DAILY) CAPS  vitamin D3 (CHOLECALCIFEROL) 2000 units (50 mcg) tablet          Review of Systems   Constitutional: Negative.  Negative for chills and fever.   HENT: Negative.     Eyes: Negative.  Negative for photophobia.   Respiratory: Negative.     Cardiovascular: Negative.    Gastrointestinal: Negative.  Negative for nausea and vomiting.   Endocrine: Negative.    Genitourinary: Negative.    Musculoskeletal: Negative.  Negative for myalgias, neck pain and neck stiffness.   Skin: Negative.    Allergic/Immunologic: Negative.    Neurological:  Positive for headaches.   Hematological: Negative.    Psychiatric/Behavioral: Negative.         Physical Exam   BP: 137/72  Pulse: 73  Temp: 97.7  F (36.5  C)  Resp: 20  Weight: 64.4 kg (142 lb)  SpO2: 97 %      Physical Exam  Constitutional:       General: She is not in acute distress.     Appearance: Normal appearance. She is normal weight. She is not toxic-appearing.   HENT:      Head: Normocephalic and atraumatic.   Cardiovascular:      Rate and Rhythm: Normal rate.   Pulmonary:      Effort: Pulmonary effort is normal.   Abdominal:      General: Abdomen is flat.   Musculoskeletal:         General: Normal range of motion.      Cervical back: Normal range of motion and neck supple.   Skin:     General: Skin is warm.      Capillary Refill: Capillary refill takes less than 2 seconds.   Neurological:      General: No focal deficit present.      Mental Status: She is alert.   Psychiatric:         Mood and Affect: Mood normal.         Behavior: Behavior normal.         ED Course              ED Course as of 02/12/24 1843   Mon Feb 12, 2024   1310 Patient feeling better.  States that symptoms are largely resolved but she still concerned about etiology of symptoms.  MRI brain to be ordered   1737 Stroke Neurology contacted   1815 Spoke to stroke neurology, Dr. Varma of [place]. No further stroke workup recommended.      Procedures    EKG  - NSR with RAD     Results for orders placed or performed during the hospital encounter of 02/12/24 (from the past 24 hour(s))   [place] Draw    Narrative    The following orders were created for panel order [place] Draw.  Procedure                               Abnormality         Status                     ---------                               -----------         ------                     Extra Blue Top Tube[[id number]]                              Final result               Extra Red Top Tube[[id number]]                               Final result               Extra Green Top (Lithium...[[id number]]                      Final result               Extra Purple Top Tube[[id number]]                            Final result               Extra Green Top (Lithium...[[id number]]                      Final result                 Please view results for these tests on the individual orders.   Extra Blue Top Tube   Result Value Ref Range    Hold Specimen JIC    Extra Red Top Tube   Result Value Ref Range    Hold Specimen JIC    Extra Green Top (Lithium Heparin) Tube   Result Value Ref Range    Hold Specimen JIC    Extra Purple Top Tube   Result Value Ref Range    Hold Specimen JIC    Extra Green Top (Lithium Heparin) ON ICE   Result Value Ref Range    Hold Specimen JIC    CBC with platelets differential    Narrative    The following orders were created for panel order CBC with platelets differential.  Procedure                               Abnormality         Status                     ---------                               -----------         ------                     CBC with platelets and d...[[id number]]                      Final result                 Please view results for these tests on the individual orders.   INR   Result Value Ref Range    INR 0.97 0.85 - 1.15   Comprehensive metabolic panel   Result Value Ref Range    Sodium 141 135 - 145 mmol/L    Potassium 3.9 3.4 - 5.3 mmol/L    Carbon Dioxide (CO2) 25 22 -  29 mmol/L    Anion Gap 11 7 - 15 mmol/L    Urea Nitrogen 18.6 8.0 - 23.0 mg/dL    Creatinine 0.87 0.51 - 0.95 mg/dL    GFR Estimate 67 >60 mL/min/1.73m2    Calcium 9.1 8.8 - 10.2 mg/dL    Chloride 105 98 - 107 mmol/L    Glucose 162 (H) 70 - 99 mg/dL    Alkaline Phosphatase 96 40 - 150 U/L    AST 36 0 - 45 U/L    ALT 27 0 - 50 U/L    Protein Total 6.8 6.4 - 8.3 g/dL    Albumin 4.4 3.5 - 5.2 g/dL    Bilirubin Total 0.2 <=1.2 mg/dL   Lactic acid whole blood   Result Value Ref Range    Lactic Acid 2.1 (H) 0.7 - 2.0 mmol/L   CBC with platelets and differential   Result Value Ref Range    WBC Count 5.5 4.0 - 11.0 10e3/uL    RBC Count 4.54 3.80 - 5.20 10e6/uL    Hemoglobin 13.8 11.7 - 15.7 g/dL    Hematocrit 42.1 35.0 - 47.0 %    MCV 93 78 - 100 fL    MCH 30.4 26.5 - 33.0 pg    MCHC 32.8 31.5 - 36.5 g/dL    RDW 12.8 10.0 - 15.0 %    Platelet Count 174 150 - 450 10e3/uL    % Neutrophils 63 %    % Lymphocytes 30 %    % Monocytes 5 %    % Eosinophils 2 %    % Basophils 0 %    % Immature Granulocytes 0 %    NRBCs per 100 WBC 0 <1 /100    Absolute Neutrophils 3.5 1.6 - 8.3 10e3/uL    Absolute Lymphocytes 1.7 0.8 - 5.3 10e3/uL    Absolute Monocytes 0.3 0.0 - 1.3 10e3/uL    Absolute Eosinophils 0.1 0.0 - 0.7 10e3/uL    Absolute Basophils 0.0 0.0 - 0.2 10e3/uL    Absolute Immature Granulocytes 0.0 <=0.4 10e3/uL    Absolute NRBCs 0.0 10e3/uL   Glucose by meter   Result Value Ref Range    GLUCOSE BY METER POCT 139 (H) 70 - 99 mg/dL   CT Head w/o Contrast    Narrative    EXAM: CT HEAD W/O CONTRAST, 2/12/2024 11:32 AM    HISTORY: 83 year old with old with complaints of confusion, memory  loss and left eye pain. Hx of recent head trauma. R/o ICH vs ischemic  stroke    COMPARISON: Brain MR 5/25/2011    TECHNIQUE:   Imaging protocol: Multiplanar CT images of the head without  intravenous contrast.   Acquisition: This CT exam was performed using one or more the  following dose reduction techniques: automated exposure control,  adjustment  of the mA and/or kV according to patient size, and/or  iterative reconstruction technique.    FINDINGS:  No intracranial hemorrhage, mass effect, or midline shift. The  ventricles are proportionate to the cerebral sulci. Patchy foci of  hypodensity in the periventricular and supraventricular white matter,  which is nonspecific, likely related to chronic small vessel ischemic  disease given the patient's age. Superior left cerebellum focal  encephalomalacia, consistent with prior lacunar type infarct. Benign  fat along the anterior falx. No acute loss of gray-white matter  differentiation.    No acute osseous abnormality. Mild to moderate paranasal sinus mucosal  thickening with layering fluid in the dominant left sphenoid sinus.  Mastoid air cells are clear. Bilateral pseudophakia.       Impression    IMPRESSION:  No acute intracranial abnormality.      FLORENTINO SEN MD         SYSTEM ID:  [id number]   UA with Microscopic reflex to Culture    Specimen: Urine, Clean Catch   Result Value Ref Range    Color Urine Yellow Colorless, Straw, Light Yellow, Yellow    Appearance Urine Clear Clear    Glucose Urine Negative Negative mg/dL    Bilirubin Urine Negative Negative    Ketones Urine Negative Negative mg/dL    Specific Gravity Urine 1.014 1.000 - 1.030    Blood Urine Negative Negative    pH Urine 6.5 5.0 - 9.0    Protein Albumin Urine Negative Negative mg/dL    Urobilinogen Urine Normal Normal, 2.0 mg/dL    Nitrite Urine Negative Negative    Leukocyte Esterase Urine Negative Negative    Mucus Urine Present (A) None Seen /LPF    RBC Urine <1 <=2 /HPF    WBC Urine 0 <=5 /HPF    Narrative    Urine Culture not indicated   CTA Head Neck with Contrast    Narrative    CT ANGIOGRAPHY OF THE BRAIN AND NECK    HISTORY: . 83 year old with old with complaints of confusion, memory  loss and left eye pain. Hx of recent head trauma. R/o ICH vs ischemic  stroke.    TECHNIQUE: Following the administration of intravenous contrast,  thin  helical CT angiography images of the brain were obtained.   Postcontrast helical thin CT angiography images of the neck were  obtained.  NASCET criteria were applied. Source, multiplanar and MIP  reformatted images were reviewed.  This CT exam was performed using  one or more the following dose reduction techniques: automated  exposure control, adjustment of the mA and/or kV according to patient  size, and/or iterative reconstruction technique.    COMPARISON: None.    FINDINGS:    CTA Brain:      Atherosclerotic calcification is seen in the cavernous carotids. The  petrous, cavernous, and supraclinoid internal carotid arteries  demonstrate no high-grade, flow limiting stenosis.    The A1 segments are symmetric. An anterior communicating artery is  present. The distal WILLIAM vessels are unremarkable. The M1 segments, MCA  bifurcations and distal MCA vessels are patent.    The basilar and vertebral arteries are patent. The PCA and SCA  branches demonstrate no focal abnormalities.      CTA Neck:     A 3 vessel aortic arch is present. The innominate and bilateral  subclavian arteries are grossly patent.    The common carotid arteries demonstrate preserved caliber. The carotid  bulbs demonstrate asymmetric plaque on the left, noncalcified,  resulting in up to 50% left internal carotid artery stenosis. No  measurable stenosis on the right.    The vertebral arteries arise from the subclavian arteries. The right  vertebral artery is dominant. There is no evidence of flow-limiting  stenosis or occlusion of the vertebral arteries.    There is a 9 x 11 mm nodule in the right upper lobe.. Multilevel  degenerative changes are seen in the cervical spine.      Impression    IMPRESSION:    No intracranial arterial occlusion, flow-limiting stenosis or  aneurysm.    No evidence of flow-limiting stenosis, dissection, or occlusion of the  cervical carotid or vertebral arteries.      9 x 11 mm right upper lobe nodule. Recommend CT  chest.    SHRUTI FARRELL MD         SYSTEM ID:  [id number]   MR Brain w/o & w Contrast    Narrative    EXAM:  MR BRAIN W/O & W CONTRAST    HISTORY:  83-year-old female who presents today with complaints of  confusion and difficulty recollecting events.  Symptoms have largely  resolved.. .    TECHNIQUE:  Sagittal T1, axial T1, T2, FLAIR, diffusion, gradient as  well as axial and 3D postcontrast imaging of the whole brain was  performed.    MEDS/CONTRAST: 13 ML DOTAREM    COMPARISON:  5/25/2011     FINDINGS:    Prominence of the ventricles and sulci is compatible with moderate,  generalized volume loss. No abnormal extra-axial collection,  hydrocephalus, mass effect or midline shift is seen. The basal  cisterns are preserved.    Mild to moderate chronic microvascular ischemic changes are seen. No  abnormal intracranial enhancement or concerning T2* gradient  susceptibility is identified. No restricted diffusion is present. The  major intracranial vascular flow voids are preserved.    The T1 marrow signal is unremarkable. The orbits are intact. Mild  paranasal sinus mucosal thickening is present.      Impression    IMPRESSION: No evidence of acute or subacute ischemia.    SHRUTI FARRELL MD         SYSTEM ID:  [id number]   Lactic acid whole blood   Result Value Ref Range    Lactic Acid 0.9 0.7 - 2.0 mmol/L       Medications - No data to display    Assessments & Plan (with Medical Decision Making)     83-year-old female who presents today with complaints of memory loss.  Symptoms resolved prior to arrival.  Also had some complaints of left-sided headache.  Workup thus far has been negative including head CT, CTA head and neck and brain MRI.      Case eventually discussed with stroke neurology who did not feel like symptoms were particular for a stroke.  Patient remained here for several hours during which she remained stable.  At time of discharge patient had no complaints.  Able to ambulate without difficulty.   Out of caution an outpatient echo will be ordered and patient to be started on aspirin 81 mg daily.      Patient instructed to follow-up with primary care doctor for review of all lab results including pulmonary nodule noted on the CTa of the head and neck        New Prescriptions    No medications on file       Final diagnoses:   TIA (transient ischemic attack)   Memory loss       2/12/2024   [facility]       Oneal Land MD  02/13/24 [id number]

## 2024-02-12 NOTE — ED TRIAGE NOTES
Pt assessed in waiting room at patient request. Pt fell hitting her head 11 days ago. This AM pt had a sudden HA around left eye. Pt then had a brief episode where she could not remember her password or address. Symptoms have resolved. BEFAST negative.    Latanya Mosquera RN on 2/12/2024 at 10:55 AM

## 2024-02-12 NOTE — ED TRIAGE NOTES
"ED Nursing Triage Note (General)   ________________________________    Darby Hawkins is a 83 year old Female that presents to triage via private vehicle with her  for complaints of memory loss.  Patient had a concussion 11 days ago and states initially after the incident she felt, \"off\", but was cleared by her family practitioner.  Patient states this morning she had sudden onset of pain over her L) eye. Patient states she was going to then look up an eye clinic on her computer and couldn't remember her password, name, or her address.  Patient states symptoms mostly resolved, however, she is still feeling foggy. Patient is not on blood thinners.  patient denies hx of stroke/TIA.   Significant symptoms had onset 1 hour(s) ago.    /72   Pulse 73   Temp 97.7  F (36.5  C) (Tympanic)   Resp 20   Wt 64.4 kg (142 lb)   SpO2 97%   BMI 22.24 kg/m     PRE HOSPITAL PRIOR LIVING SITUATION Spouse      Triage Assessment (Adult)       Row Name 02/12/24 1100          Triage Assessment    Airway WDL WDL        Respiratory WDL    Respiratory WDL WDL        Skin Circulation/Temperature WDL    Skin Circulation/Temperature WDL WDL        Cardiac WDL    Cardiac WDL WDL     Cardiac Rhythm NSR        Peripheral/Neurovascular WDL    Peripheral Neurovascular WDL WDL     Capillary Refill, General less than/equal to 3 secs        Cognitive/Neuro/Behavioral WDL    Cognitive/Neuro/Behavioral WDL X        Laure Coma Scale    Best Eye Response 4-->(E4) spontaneous     Best Motor Response 6-->(M6) obeys commands     Best Verbal Response 5-->(V5) oriented     Laure Coma Scale Score 15                     "

## 2024-02-13 ENCOUNTER — HOSPITAL ENCOUNTER (OUTPATIENT)
Dept: CARDIOLOGY | Facility: OTHER | Age: 84
Discharge: HOME | End: 2024-02-13
Attending: EMERGENCY MEDICINE | Admitting: EMERGENCY MEDICINE
Payer: MEDICARE

## 2024-02-13 DIAGNOSIS — R41.3 MEMORY LOSS: ICD-10-CM

## 2024-02-13 LAB
ATRIAL RATE - MUSE: 78 BPM
DIASTOLIC BLOOD PRESSURE - MUSE: NORMAL MMHG
INTERPRETATION ECG - MUSE: NORMAL
LVEF ECHO: NORMAL
P AXIS - MUSE: 87 DEGREES
PR INTERVAL - MUSE: 142 MS
QRS DURATION - MUSE: 80 MS
QT - MUSE: 400 MS
QTC - MUSE: 456 MS
R AXIS - MUSE: 91 DEGREES
SYSTOLIC BLOOD PRESSURE - MUSE: NORMAL MMHG
T AXIS - MUSE: 52 DEGREES
VENTRICULAR RATE- MUSE: 78 BPM

## 2024-02-13 PROCEDURE — 93306 TTE W/DOPPLER COMPLETE: CPT | Mod: GZ

## 2024-02-13 PROCEDURE — 93306 TTE W/DOPPLER COMPLETE: CPT | Mod: 26 | Performed by: STUDENT IN AN ORGANIZED HEALTH CARE EDUCATION/TRAINING PROGRAM

## 2024-02-23 ENCOUNTER — HOSPITAL ENCOUNTER (OUTPATIENT)
Dept: CT IMAGING | Facility: OTHER | Age: 84
Discharge: HOME | End: 2024-02-23
Attending: FAMILY MEDICINE | Admitting: FAMILY MEDICINE
Payer: MEDICARE

## 2024-02-23 DIAGNOSIS — R91.8 PULMONARY NODULES: ICD-10-CM

## 2024-02-23 PROCEDURE — 71250 CT THORAX DX C-: CPT

## 2024-04-11 ENCOUNTER — TRANSFERRED RECORDS (OUTPATIENT)
Dept: HEALTH INFORMATION MANAGEMENT | Facility: CLINIC | Age: 84
End: 2024-04-11
Payer: COMMERCIAL

## 2024-04-11 ENCOUNTER — MEDICAL CORRESPONDENCE (OUTPATIENT)
Dept: HEALTH INFORMATION MANAGEMENT | Facility: CLINIC | Age: 84
End: 2024-04-11
Payer: COMMERCIAL

## 2024-04-17 ENCOUNTER — ORDERS ONLY (AUTO-RELEASED) (OUTPATIENT)
Dept: CARDIOLOGY | Facility: CLINIC | Age: 84
End: 2024-04-17
Payer: MEDICARE

## 2024-04-17 DIAGNOSIS — R29.818 OTHER SYMPTOMS AND SIGNS INVOLVING THE NERVOUS SYSTEM: Primary | ICD-10-CM

## 2024-04-17 DIAGNOSIS — R29.818 OTHER SYMPTOMS AND SIGNS INVOLVING THE NERVOUS SYSTEM: ICD-10-CM

## 2024-04-24 ENCOUNTER — ORDERS ONLY (AUTO-RELEASED) (OUTPATIENT)
Dept: CARDIOLOGY | Facility: CLINIC | Age: 84
End: 2024-04-24
Payer: MEDICARE

## 2024-04-24 DIAGNOSIS — R29.818 OTHER SYMPTOMS AND SIGNS INVOLVING THE NERVOUS SYSTEM: ICD-10-CM

## 2024-05-16 PROCEDURE — 93248 EXT ECG>7D<15D REV&INTERPJ: CPT | Performed by: INTERNAL MEDICINE

## 2024-05-29 PROCEDURE — 93248 EXT ECG>7D<15D REV&INTERPJ: CPT | Performed by: INTERNAL MEDICINE

## 2024-07-07 ENCOUNTER — HEALTH MAINTENANCE LETTER (OUTPATIENT)
Age: 84
End: 2024-07-07

## 2024-08-20 ENCOUNTER — HOSPITAL ENCOUNTER (OUTPATIENT)
Dept: CT IMAGING | Facility: OTHER | Age: 84
Discharge: HOME | End: 2024-08-20
Attending: FAMILY MEDICINE | Admitting: FAMILY MEDICINE
Payer: MEDICARE

## 2024-08-20 DIAGNOSIS — R91.1 PULMONARY NODULE: ICD-10-CM

## 2024-08-20 PROCEDURE — 71250 CT THORAX DX C-: CPT

## 2024-11-20 NOTE — TELEPHONE ENCOUNTER
Patient call and Grand Zeny is fine with her.   Sheeba Mcdonough LPN ..........5/4/2018 3:11 PM    [Mother] : mother

## 2025-06-27 ENCOUNTER — LAB REQUISITION (OUTPATIENT)
Dept: LAB | Facility: CLINIC | Age: 85
End: 2025-06-27
Payer: COMMERCIAL

## 2025-06-27 DIAGNOSIS — D48.5 NEOPLASM OF UNCERTAIN BEHAVIOR OF SKIN: ICD-10-CM

## 2025-06-27 PROCEDURE — 88305 TISSUE EXAM BY PATHOLOGIST: CPT | Mod: TC,ORL | Performed by: STUDENT IN AN ORGANIZED HEALTH CARE EDUCATION/TRAINING PROGRAM

## 2025-06-27 PROCEDURE — 88305 TISSUE EXAM BY PATHOLOGIST: CPT | Mod: 26 | Performed by: DERMATOLOGY

## 2025-07-13 ENCOUNTER — HEALTH MAINTENANCE LETTER (OUTPATIENT)
Age: 85
End: 2025-07-13

## (undated) RX ORDER — ACETAMINOPHEN 325 MG/1
TABLET ORAL
Status: DISPENSED
Start: 2024-02-12